# Patient Record
Sex: FEMALE | Race: WHITE | NOT HISPANIC OR LATINO | Employment: FULL TIME | URBAN - METROPOLITAN AREA
[De-identification: names, ages, dates, MRNs, and addresses within clinical notes are randomized per-mention and may not be internally consistent; named-entity substitution may affect disease eponyms.]

---

## 2018-06-08 ENCOUNTER — TRANSCRIBE ORDERS (OUTPATIENT)
Dept: ADMINISTRATIVE | Facility: HOSPITAL | Age: 48
End: 2018-06-08

## 2018-06-08 DIAGNOSIS — R40.0 SLEEPINESS: Primary | ICD-10-CM

## 2018-06-08 DIAGNOSIS — R06.83 SNORING: ICD-10-CM

## 2018-08-07 ENCOUNTER — OFFICE VISIT (OUTPATIENT)
Dept: SLEEP CENTER | Facility: CLINIC | Age: 48
End: 2018-08-07
Payer: COMMERCIAL

## 2018-08-07 VITALS
SYSTOLIC BLOOD PRESSURE: 126 MMHG | WEIGHT: 267 LBS | DIASTOLIC BLOOD PRESSURE: 84 MMHG | BODY MASS INDEX: 39.55 KG/M2 | HEART RATE: 72 BPM | HEIGHT: 69 IN

## 2018-08-07 DIAGNOSIS — R06.83 SNORING: ICD-10-CM

## 2018-08-07 DIAGNOSIS — R40.0 SLEEPINESS: ICD-10-CM

## 2018-08-07 DIAGNOSIS — G47.33 OSA (OBSTRUCTIVE SLEEP APNEA): Primary | ICD-10-CM

## 2018-08-07 PROCEDURE — 99204 OFFICE O/P NEW MOD 45 MIN: CPT | Performed by: INTERNAL MEDICINE

## 2018-08-07 RX ORDER — IBUPROFEN 600 MG/1
TABLET ORAL
COMMUNITY
Start: 2018-07-23

## 2018-08-07 RX ORDER — FLUTICASONE PROPIONATE 50 MCG
SPRAY, SUSPENSION (ML) NASAL
COMMUNITY
Start: 2010-11-04

## 2018-08-07 RX ORDER — LANCETS 33 GAUGE
EACH MISCELLANEOUS
COMMUNITY
Start: 2013-02-07

## 2018-08-07 RX ORDER — SERTRALINE HYDROCHLORIDE 100 MG/1
TABLET, FILM COATED ORAL
COMMUNITY
Start: 2018-06-25

## 2018-08-07 RX ORDER — ALPRAZOLAM 0.25 MG/1
0.25 TABLET ORAL 3 TIMES DAILY
COMMUNITY
Start: 2017-12-27

## 2018-08-07 RX ORDER — REPAGLINIDE 2 MG/1
TABLET ORAL
COMMUNITY
Start: 2016-07-28

## 2018-08-07 RX ORDER — LISINOPRIL 10 MG/1
TABLET ORAL
COMMUNITY
Start: 2018-06-25

## 2018-08-07 RX ORDER — ATORVASTATIN CALCIUM 40 MG/1
1 TABLET, FILM COATED ORAL
COMMUNITY
Start: 2013-02-07

## 2018-08-07 NOTE — PROGRESS NOTES
[facility] - [address] [facility] : 1970  MRN: [id number]      Assessment:  The patient's symptoms are consistent with obstructive sleep apnea  Her primary problem seems to be retrognathia with a large tongue base  Plan:  Diagnostic polysomnography    Follow up: After testing    History of Present Illness:   46 y  o female with a 4 year history of worsening snoring and excessive daytime sleepiness  She was diagnosed with type 2 diabetes approximately 10 years ago and was noted to have high blood sugars overnight  Dr Meenu Salvador her endocrinologist, was concerned about possible obstructive sleep apnea  In addition to loud snoring and diabetes, she has excessive daytime sleepiness  She never feels fully alert or refreshed  She sometimes feels drowsy at work, where she works as a    Her sleep is inadequate due in part to her work schedule  Although she has no history of hypertension, she is on an antihypertensive for renal protection  Her weight has been steady, but she has been unsuccessful in weight loss  She has a chronic smoker and has history of vocal cord polyps  She denies any history of hypertension or cardiovascular disease  There are no witnessed apneas, although there are brief periods of interruption in her snoring  The patient's brother has obstructive sleep apnea and uses CPAP      Review of Systems:          Genitourinary none   Cardiology ankle/leg swelling   Gastrointestinal frequent heartburn/acid reflux   Neurology poor concentration or confusion,    Constitutional fatigue   Integumentary none   Psychiatry anxiety, depression and aggressiveness or irritability   Musculoskeletal joint pain, muscle aches, back pain and leg cramps   Pulmonary snoring   ENT throat clearing   Endocrine none   Hematological none         Historical Information    Past Medical History:  Type 2 diabetes mellitus, vocal cord polyps      Family History: The patient's brother has MEET and uses CPAP      Sleep Schedule: unremarkable    Snoring:    Yes      Witnessed Apnea:    No    Medications/Allergies:    Current Outpatient Prescriptions:     ALPRAZolam (XANAX) 0 25 mg tablet, Take 0 25 mg by mouth Three times a day, Disp: , Rfl:     Ascorbic Acid 100 MG CHEW, as directed, Disp: , Rfl:     atorvastatin (LIPITOR) 40 mg tablet, Take 1 tablet by mouth, Disp: , Rfl:     BD ULTRA-FINE LANCETS lancets, USE 2 DAILY, Disp: , Rfl:     Dulaglutide (TRULICITY) 1 5 BW/2 9RS SOPN, INJECT 1 5 MG SQ Q WEEK, Disp: , Rfl:     Dulaglutide 0 75 MG/0 5ML SOPN, Inject 0 75 mg under the skin, Disp: , Rfl:     fluticasone (FLONASE) 50 mcg/act nasal spray, into each nostril, Disp: , Rfl:     glucose blood test strip, test 2  times daily, Disp: , Rfl:     ibuprofen (MOTRIN) 600 mg tablet, Take 1 tablet by mouth q8 hours as needed for mild pain, Disp: , Rfl:     insulin glargine (LANTUS SOLOSTAR) 100 units/mL injection pen, Inject 10 Units under the skin, Disp: , Rfl:     lisinopril (ZESTRIL) 10 mg tablet, , Disp: , Rfl:     metFORMIN (GLUCOPHAGE) 1000 MG tablet, Take 1 tablet by mouth, Disp: , Rfl:     Needles & Syringes MISC, 32 G x 4MM Use QD, Disp: , Rfl:     ONETOUCH DELICA LANCETS 49O MISC, test 2 times daily, Disp: , Rfl:     repaglinide (PRANDIN) 2 mg tablet, 1 tablet with supper daily  , Disp: , Rfl:     sertraline (ZOLOFT) 100 mg tablet, 2 tablets daily  , Disp: , Rfl:         No notes on file                  Objective:    Vital Signs:   Vitals:    08/07/18 1000   BP: 126/84   Pulse: 72   Weight: 121 kg (267 lb)   Height: 5' 9" (1 753 m)     Neck Circumference: 42      [place] Sleepiness Scale:  Total score: 9    Physical Exam:    General: Alert, appropriate, cooperative, overweight    Head: NC/AT, moderate retrognathia    Nose: No septal deviation, nares partially obstructed, mucosa normal    Throat: Airway lumen narrowed, tongue base thickened, no tonsils visualized    Neck: Normal, no thyromegaly or lymphadenopathy, no JVD    Heart: RR, normal S1 and S2, no murmurs    Chest: Clear bilaterally    Extremity: No clubbing, cyanosis, no edema    Skin: Warm, dry    Neuro: No motor abnormalities, cranial nerves appear intact      Counseling / Coordination of Care  Total clinic time spent today 30 minutes  Greater than 50% of total time was spent with the patient and / or family counseling and / or coordination of care  A description of the counseling / coordination of care: We discussed the pathophysiology of obstructive sleep apnea as well as the possible treatment options  We also discussed the rationale for positive airway pressure therapy  FELICE Henry    Board Certified Sleep Specialist

## 2018-10-04 ENCOUNTER — TELEPHONE (OUTPATIENT)
Dept: SLEEP CENTER | Facility: CLINIC | Age: 48
End: 2018-10-04

## 2018-10-04 DIAGNOSIS — G47.33 OSA (OBSTRUCTIVE SLEEP APNEA): Primary | ICD-10-CM

## 2019-01-04 ENCOUNTER — HOSPITAL ENCOUNTER (OUTPATIENT)
Dept: SLEEP CENTER | Facility: CLINIC | Age: 49
Discharge: HOME | End: 2019-01-04
Payer: COMMERCIAL

## 2019-01-04 DIAGNOSIS — G47.33 OSA (OBSTRUCTIVE SLEEP APNEA): ICD-10-CM

## 2019-01-04 PROCEDURE — G0399 HOME SLEEP TEST/TYPE 3 PORTA: HCPCS

## 2019-01-07 ENCOUNTER — TRANSCRIBE ORDERS (OUTPATIENT)
Dept: SLEEP CENTER | Facility: CLINIC | Age: 49
End: 2019-01-07

## 2019-01-10 ENCOUNTER — TELEPHONE (OUTPATIENT)
Dept: SLEEP CENTER | Facility: CLINIC | Age: 49
End: 2019-01-10

## 2019-01-10 NOTE — TELEPHONE ENCOUNTER
Spoke w/ pt's partner- discussed study results   Scheduled for f/u in [facility] [place] [facility] @ 12:30 w/ Dr Rosi Harrell

## 2019-01-10 NOTE — TELEPHONE ENCOUNTER
----- Message from Fiona Blackman MD sent at 1/9/2019  5:32 PM EST -----  Follow-up in sleep clinic    Thanks

## 2019-02-08 ENCOUNTER — OFFICE VISIT (OUTPATIENT)
Dept: SLEEP CENTER | Facility: CLINIC | Age: 49
End: 2019-02-08
Payer: COMMERCIAL

## 2019-02-08 VITALS
DIASTOLIC BLOOD PRESSURE: 82 MMHG | HEART RATE: 74 BPM | HEIGHT: 69 IN | WEIGHT: 271 LBS | SYSTOLIC BLOOD PRESSURE: 126 MMHG | BODY MASS INDEX: 40.14 KG/M2

## 2019-02-08 DIAGNOSIS — F51.04 PSYCHOPHYSIOLOGICAL INSOMNIA: ICD-10-CM

## 2019-02-08 DIAGNOSIS — G47.33 OSA (OBSTRUCTIVE SLEEP APNEA): Primary | ICD-10-CM

## 2019-02-08 PROCEDURE — 99214 OFFICE O/P EST MOD 30 MIN: CPT | Performed by: INTERNAL MEDICINE

## 2019-02-08 RX ORDER — ZOLPIDEM TARTRATE 10 MG/1
TABLET ORAL
Qty: 1 TABLET | Refills: 0 | Status: SHIPPED | OUTPATIENT
Start: 2019-02-08

## 2019-02-08 NOTE — PROGRESS NOTES
Progress Note - Sleep Center   Eamon Kapoor :1970 MRN: [id number]      Reason for Visit:    46 y  o female with suspected obstructive sleep apnea    Assessment:  Home sleep testing demonstrated very mild MEET with a respiratory event index = 3 6  She feels extremely tired, although it is unclear how much is fatigue and how much is drowsiness  At this point, it is necessary to obtain an in-lab polysomnography to see if there is a significant degree of obstructive sleep apnea that may have been missed by home sleep testing  Also periodic limb movement disorder can be assessed  The patient has vitamin-D deficiency, which could be contributing  She denies any symptoms of depression  Plan:  Diagnostic polysomnography  I prescribed a single dose of Ambien to use on the night of her sleep study  She reports that outside her home, she has severe insomnia  Follow up: After testing    History of Present Illness: The patient continues to experience daytime sleepiness/fatigue  Her home sleep test showed very mild obstructive sleep apnea  Historical Information    Past Medical History: No past medical history on file  Past Surgical History: No past surgical history on file  Social History - see chart  History   Alcohol use: Not on file       History   Smoking Status    Not on file   Smokeless Tobacco    Not on file     Family History: No family history on file      Medications/Allergies:      Current Outpatient Prescriptions:     ALPRAZolam (XANAX) 0 25 mg tablet, Take 0 25 mg by mouth Three times a day, Disp: , Rfl:     Ascorbic Acid 100 MG CHEW, as directed, Disp: , Rfl:     atorvastatin (LIPITOR) 40 mg tablet, Take 1 tablet by mouth, Disp: , Rfl:     BD ULTRA-FINE LANCETS lancets, USE 2 DAILY, Disp: , Rfl:     Cholecalciferol (VITAMIN D3) 66229 units TABS, Take by mouth, Disp: , Rfl:     Dulaglutide (TRULICITY) 1 5 WV/1 6EB SOPN, INJECT 1 5 MG SQ Q WEEK, Disp: , Rfl:    Dulaglutide 0 75 MG/0 5ML SOPN, Inject 0 75 mg under the skin, Disp: , Rfl:     fluticasone (FLONASE) 50 mcg/act nasal spray, into each nostril, Disp: , Rfl:     glucose blood test strip, test 2  times daily, Disp: , Rfl:     ibuprofen (MOTRIN) 600 mg tablet, Take 1 tablet by mouth q8 hours as needed for mild pain, Disp: , Rfl:     insulin glargine (LANTUS SOLOSTAR) 100 units/mL injection pen, Inject 10 Units under the skin, Disp: , Rfl:     lisinopril (ZESTRIL) 10 mg tablet, , Disp: , Rfl:     metFORMIN (GLUCOPHAGE) 1000 MG tablet, Take 1 tablet by mouth, Disp: , Rfl:     Needles & Syringes MISC, 32 G x 4MM Use QD, Disp: , Rfl:     ONETOUCH DELICA LANCETS 35M MISC, test 2 times daily, Disp: , Rfl:     repaglinide (PRANDIN) 2 mg tablet, 1 tablet with supper daily  , Disp: , Rfl:     sertraline (ZOLOFT) 100 mg tablet, 2 tablets daily  , Disp: , Rfl:     zolpidem (AMBIEN) 10 mg tablet, I/2 to 1 tablet for sleep study, Disp: 1 tablet, Rfl: 0      Objective    Vital Signs:   Vitals:    02/08/19 1200   BP: 126/82   Pulse: 74   Weight: 123 kg (271 lb)   Height: 5' 9" (1 753 m)     [place] Sleepiness Scale: Total score: 10    Physical Exam:    General: Alert, appropriate, cooperative, overweight    Head: NC/AT    Skin: Warm, dry    Neuro: No motor abnormalities, cranial nerves appear intact    Psych: Normal affect    I have reviewed and updated the review of systems as necessary        FELICE Ramirez    Board Certified Sleep Specialist  Review of Systems      Genitourinary none   Cardiology none   Gastrointestinal none   Neurology none   Constitutional fatigue   Integumentary none   Psychiatry anxiety and depression   Musculoskeletal joint pain, back pain and sciatica   Pulmonary snoring   ENT none   Endocrine none   Hematological none